# Patient Record
Sex: FEMALE | Race: WHITE | Employment: FULL TIME | URBAN - METROPOLITAN AREA
[De-identification: names, ages, dates, MRNs, and addresses within clinical notes are randomized per-mention and may not be internally consistent; named-entity substitution may affect disease eponyms.]

---

## 2024-09-23 ENCOUNTER — TELEPHONE (OUTPATIENT)
Age: 44
End: 2024-09-23

## 2024-09-23 DIAGNOSIS — N30.10 IC (INTERSTITIAL CYSTITIS): Primary | ICD-10-CM

## 2024-09-24 RX ORDER — AMITRIPTYLINE HYDROCHLORIDE 10 MG/1
10 TABLET ORAL NIGHTLY
Qty: 90 TABLET | Refills: 0 | Status: SHIPPED | OUTPATIENT
Start: 2024-09-24

## 2024-10-06 NOTE — PROGRESS NOTES
monthly menses & in the shower where tactile response is accentuated with wet, soapy skin. Palpate inward from the armpit in a circular fashion towards the nipple. Gently squeeze the nipple. Evaluate for skin changes, redness, pain, masses, or nipple discharge.    - Influenza vaccines should be considered by all, DPT, HPV (if 12-45 yrs old), Pneumovax, & Shingles vaccines are recommended if not immune. Covid vaccinations and boosters are recommended.    - Additional calcium supplements should be added as well as vitamin D for bone health. Bone density testing to assess the health of your bones begins close to menopause or age 50. Treatment with HRT, SERMs, or bisphosphonates is based on a T score of - 2 or -1.5 with risk factors. Follow up bone density scans are based on current T scores.          2.   Educational handouts were discussed & given when applicable.     3.   Testing recommendations were given as indicated.     4.   Detailed questionnaires regarding the patient's specific condition were given to the patient when indicated. The patient understands that these are her responsibility to complete and return on a voluntary basis. Reminders to complete the questionnaires will not be given. The patient understands that failure to return the questionnaires may not give the provider a full picture of the patient's urogynecologic issues and make treatment less than optimal despite the practitioner's best effort to obtain information.     Multiple records reviewed. All questions were addressed to the patient's satisfaction.  Additional time (minutes) spent regarding today's visit:  30 Performing: ACVISITTIMEPERFORM: Pre-visit chart review and note construction, Extensive counseling, and Covering additional health topics on top of those listed in the chief complaint    Point of care: Medical decision making and point of care discussed with the team & supervising physician to qualify as a shared visit when applicable,

## 2024-10-07 ENCOUNTER — OFFICE VISIT (OUTPATIENT)
Age: 44
End: 2024-10-07
Payer: COMMERCIAL

## 2024-10-07 ENCOUNTER — HOSPITAL ENCOUNTER (OUTPATIENT)
Age: 44
Setting detail: SPECIMEN
Discharge: HOME | End: 2024-10-07

## 2024-10-07 VITALS
DIASTOLIC BLOOD PRESSURE: 89 MMHG | HEART RATE: 69 BPM | HEIGHT: 69 IN | SYSTOLIC BLOOD PRESSURE: 126 MMHG | TEMPERATURE: 98.4 F | WEIGHT: 162 LBS | BODY MASS INDEX: 23.99 KG/M2

## 2024-10-07 DIAGNOSIS — Z01.419 WELL WOMAN EXAM WITH ROUTINE GYNECOLOGICAL EXAM: Primary | ICD-10-CM

## 2024-10-07 DIAGNOSIS — R39.89 BLADDER PAIN: ICD-10-CM

## 2024-10-07 DIAGNOSIS — Z12.31 ENCOUNTER FOR SCREENING MAMMOGRAM FOR MALIGNANT NEOPLASM OF BREAST: ICD-10-CM

## 2024-10-07 DIAGNOSIS — N83.201 CYST OF RIGHT OVARY: ICD-10-CM

## 2024-10-07 PROCEDURE — 99396 PREV VISIT EST AGE 40-64: CPT

## 2024-10-07 ASSESSMENT — ENCOUNTER SYMPTOMS: GASTROINTESTINAL NEGATIVE: 1

## 2024-10-09 LAB
HPV I/H RISK 4 DNA CVX QL NAA+PROBE: NOT DETECTED
HPV SAMPLE: NORMAL
HPV, INTERPRETATION: NORMAL
HPV16 DNA CVX QL NAA+PROBE: NOT DETECTED
HPV18 DNA CVX QL NAA+PROBE: NOT DETECTED
SPECIMEN DESCRIPTION: NORMAL

## 2024-10-11 LAB — CYTOLOGY REPORT: NORMAL

## 2024-10-16 DIAGNOSIS — N30.10 IC (INTERSTITIAL CYSTITIS): ICD-10-CM

## 2024-10-16 RX ORDER — AMITRIPTYLINE HYDROCHLORIDE 10 MG/1
10 TABLET ORAL NIGHTLY
Qty: 30 TABLET | Refills: 0 | Status: SHIPPED | OUTPATIENT
Start: 2024-10-16

## 2024-10-31 ENCOUNTER — PATIENT MESSAGE (OUTPATIENT)
Age: 44
End: 2024-10-31

## 2024-10-31 DIAGNOSIS — N83.201 CYST OF RIGHT OVARY: Primary | ICD-10-CM

## 2024-12-19 DIAGNOSIS — N30.10 IC (INTERSTITIAL CYSTITIS): ICD-10-CM

## 2024-12-19 RX ORDER — AMITRIPTYLINE HYDROCHLORIDE 10 MG/1
10 TABLET ORAL NIGHTLY
Qty: 90 TABLET | Refills: 0 | Status: SHIPPED | OUTPATIENT
Start: 2024-12-19

## 2025-03-16 DIAGNOSIS — N30.10 IC (INTERSTITIAL CYSTITIS): ICD-10-CM

## 2025-03-17 RX ORDER — AMITRIPTYLINE HYDROCHLORIDE 10 MG/1
10 TABLET ORAL NIGHTLY
Qty: 90 TABLET | Refills: 0 | Status: SHIPPED | OUTPATIENT
Start: 2025-03-17

## 2025-05-01 ENCOUNTER — RESULTS FOLLOW-UP (OUTPATIENT)
Age: 45
End: 2025-05-01

## 2025-05-01 ENCOUNTER — PATIENT MESSAGE (OUTPATIENT)
Age: 45
End: 2025-05-01

## 2025-06-14 DIAGNOSIS — N30.10 IC (INTERSTITIAL CYSTITIS): ICD-10-CM

## 2025-06-16 RX ORDER — AMITRIPTYLINE HYDROCHLORIDE 10 MG/1
10 TABLET ORAL NIGHTLY
Qty: 90 TABLET | Refills: 0 | Status: SHIPPED | OUTPATIENT
Start: 2025-06-16